# Patient Record
Sex: MALE | Race: OTHER | HISPANIC OR LATINO | URBAN - METROPOLITAN AREA
[De-identification: names, ages, dates, MRNs, and addresses within clinical notes are randomized per-mention and may not be internally consistent; named-entity substitution may affect disease eponyms.]

---

## 2023-07-03 ENCOUNTER — INPATIENT (INPATIENT)
Facility: HOSPITAL | Age: 58
LOS: 1 days | Discharge: HOME | DRG: 409 | End: 2023-07-05
Attending: SURGERY | Admitting: SURGERY
Payer: COMMERCIAL

## 2023-07-03 ENCOUNTER — TRANSCRIPTION ENCOUNTER (OUTPATIENT)
Age: 58
End: 2023-07-03

## 2023-07-03 VITALS
WEIGHT: 205.03 LBS | HEART RATE: 101 BPM | HEIGHT: 69 IN | RESPIRATION RATE: 20 BRPM | SYSTOLIC BLOOD PRESSURE: 139 MMHG | TEMPERATURE: 98 F | OXYGEN SATURATION: 95 % | DIASTOLIC BLOOD PRESSURE: 91 MMHG

## 2023-07-03 LAB
ALBUMIN SERPL ELPH-MCNC: 4.3 G/DL — SIGNIFICANT CHANGE UP (ref 3.3–5)
ALP SERPL-CCNC: 78 U/L — SIGNIFICANT CHANGE UP (ref 40–120)
ALT FLD-CCNC: 55 U/L — HIGH (ref 10–45)
ANION GAP SERPL CALC-SCNC: 14 MMOL/L — SIGNIFICANT CHANGE UP (ref 5–17)
AST SERPL-CCNC: 53 U/L — HIGH (ref 10–40)
BASOPHILS # BLD AUTO: 0.05 K/UL — SIGNIFICANT CHANGE UP (ref 0–0.2)
BASOPHILS NFR BLD AUTO: 0.4 % — SIGNIFICANT CHANGE UP (ref 0–2)
BILIRUB SERPL-MCNC: 1.1 MG/DL — SIGNIFICANT CHANGE UP (ref 0.2–1.2)
BUN SERPL-MCNC: 11 MG/DL — SIGNIFICANT CHANGE UP (ref 7–23)
CALCIUM SERPL-MCNC: 9.2 MG/DL — SIGNIFICANT CHANGE UP (ref 8.4–10.5)
CHLORIDE SERPL-SCNC: 102 MMOL/L — SIGNIFICANT CHANGE UP (ref 96–108)
CO2 SERPL-SCNC: 23 MMOL/L — SIGNIFICANT CHANGE UP (ref 22–31)
CREAT SERPL-MCNC: 0.85 MG/DL — SIGNIFICANT CHANGE UP (ref 0.5–1.3)
EGFR: 101 ML/MIN/1.73M2 — SIGNIFICANT CHANGE UP
EOSINOPHIL # BLD AUTO: 0.11 K/UL — SIGNIFICANT CHANGE UP (ref 0–0.5)
EOSINOPHIL NFR BLD AUTO: 0.9 % — SIGNIFICANT CHANGE UP (ref 0–6)
GAS PNL BLDV: SIGNIFICANT CHANGE UP
GLUCOSE SERPL-MCNC: 105 MG/DL — HIGH (ref 70–99)
HCT VFR BLD CALC: 44.9 % — SIGNIFICANT CHANGE UP (ref 39–50)
HGB BLD-MCNC: 14.5 G/DL — SIGNIFICANT CHANGE UP (ref 13–17)
IMM GRANULOCYTES NFR BLD AUTO: 0.3 % — SIGNIFICANT CHANGE UP (ref 0–0.9)
LIDOCAIN IGE QN: 25 U/L — SIGNIFICANT CHANGE UP (ref 7–60)
LYMPHOCYTES # BLD AUTO: 25.2 % — SIGNIFICANT CHANGE UP (ref 13–44)
LYMPHOCYTES # BLD AUTO: 3.16 K/UL — SIGNIFICANT CHANGE UP (ref 1–3.3)
MCHC RBC-ENTMCNC: 27.3 PG — SIGNIFICANT CHANGE UP (ref 27–34)
MCHC RBC-ENTMCNC: 32.3 GM/DL — SIGNIFICANT CHANGE UP (ref 32–36)
MCV RBC AUTO: 84.6 FL — SIGNIFICANT CHANGE UP (ref 80–100)
MONOCYTES # BLD AUTO: 0.86 K/UL — SIGNIFICANT CHANGE UP (ref 0–0.9)
MONOCYTES NFR BLD AUTO: 6.9 % — SIGNIFICANT CHANGE UP (ref 2–14)
NEUTROPHILS # BLD AUTO: 8.31 K/UL — HIGH (ref 1.8–7.4)
NEUTROPHILS NFR BLD AUTO: 66.3 % — SIGNIFICANT CHANGE UP (ref 43–77)
NRBC # BLD: 0 /100 WBCS — SIGNIFICANT CHANGE UP (ref 0–0)
PLATELET # BLD AUTO: 239 K/UL — SIGNIFICANT CHANGE UP (ref 150–400)
POTASSIUM SERPL-MCNC: 4 MMOL/L — SIGNIFICANT CHANGE UP (ref 3.5–5.3)
POTASSIUM SERPL-SCNC: 4 MMOL/L — SIGNIFICANT CHANGE UP (ref 3.5–5.3)
PROT SERPL-MCNC: 7.5 G/DL — SIGNIFICANT CHANGE UP (ref 6–8.3)
RBC # BLD: 5.31 M/UL — SIGNIFICANT CHANGE UP (ref 4.2–5.8)
RBC # FLD: 13.4 % — SIGNIFICANT CHANGE UP (ref 10.3–14.5)
SODIUM SERPL-SCNC: 139 MMOL/L — SIGNIFICANT CHANGE UP (ref 135–145)
WBC # BLD: 12.53 K/UL — HIGH (ref 3.8–10.5)
WBC # FLD AUTO: 12.53 K/UL — HIGH (ref 3.8–10.5)

## 2023-07-03 PROCEDURE — 99285 EMERGENCY DEPT VISIT HI MDM: CPT

## 2023-07-03 PROCEDURE — 71045 X-RAY EXAM CHEST 1 VIEW: CPT | Mod: 26

## 2023-07-03 RX ORDER — FAMOTIDINE 10 MG/ML
20 INJECTION INTRAVENOUS ONCE
Refills: 0 | Status: COMPLETED | OUTPATIENT
Start: 2023-07-03 | End: 2023-07-03

## 2023-07-03 RX ORDER — ACETAMINOPHEN 500 MG
1000 TABLET ORAL ONCE
Refills: 0 | Status: COMPLETED | OUTPATIENT
Start: 2023-07-03 | End: 2023-07-03

## 2023-07-03 RX ORDER — SODIUM CHLORIDE 9 MG/ML
1000 INJECTION, SOLUTION INTRAVENOUS ONCE
Refills: 0 | Status: COMPLETED | OUTPATIENT
Start: 2023-07-03 | End: 2023-07-03

## 2023-07-03 RX ADMIN — SODIUM CHLORIDE 1000 MILLILITER(S): 9 INJECTION, SOLUTION INTRAVENOUS at 21:29

## 2023-07-03 RX ADMIN — FAMOTIDINE 20 MILLIGRAM(S): 10 INJECTION INTRAVENOUS at 21:30

## 2023-07-03 RX ADMIN — Medication 400 MILLIGRAM(S): at 22:02

## 2023-07-03 NOTE — ED PROVIDER NOTE - ATTENDING CONTRIBUTION TO CARE
Attending MD Martini:  I personally have seen and examined this patient. I have performed a substantive portion of the visit including all aspects of the medical decision making.  Resident note reviewed and agree on plan of care and except where noted.      57-year-old gentleman presenting for evaluation of chest and abdominal pain since 3 days prior.  Patient states that the pain is constant worse with twisting motions of the torso.  He denies any nausea or vomiting.  He states he is also having subjective fevers and chills.  No bowel or bladder changes.  No blood in the stool.  He took diclofenac yesterday for the pain and it seemed to help.    Vital signs nonactionable patient sitting in the stretcher in no apparent distress.  Abdomen is soft nondistended mild tenderness in the epigastric region and intermittently in the right upper quadrant, negative Peres sign.  Extremities warm and well-perfused.  No midline spinal tenderness.  Moves all extremities spontaneously.    Patient underwent lab work in triage area that was notable for white count of 12, mild transaminitis in the 50s otherwise nonactionable, specifically normal lipase.  Patient had right upper quadrant ultrasound performed that did show evidence of cholelithiasis but no direct evidence of cholecystitis.  Also question of "renal mass of the interpolar region.".  Given these findings and unclear etiology of pain we will obtain CT abdomen/pelvis.  EKG is without ischemic findings so doubt cardiac ischemia however will rule out ACS with cardiac biomarkers.  I think it is unlikely that the patient has symptomatic cholelithiasis at this juncture but we will readdress this if no other pathology is identified on the CT abdomen pelvis.        *The above represents an initial assessment/impression. Please refer to progress notes for potential changes in patient clinical course*

## 2023-07-03 NOTE — ED PROVIDER NOTE - CLINICAL SUMMARY MEDICAL DECISION MAKING FREE TEXT BOX
57 year old male with no pmh come sinto the Ed for eval of diffuse abd pain that is worse on the right upper quadrant since  6/30. It was a sudden onset and he has had constant pain since. He has had some associated fever, chills, and nausea but no vomiting. Pt was QDoc'd and had RUQ US which shows stones but no cholecystitis. Pain does not appear to be consistent with just billiary colic. Pt has significant abd tenderness so will order a CT abd pelvis to assess for renal pathology, appy, diverticulitis. If work up is negative will reassess, PO challenge to determine dispo.

## 2023-07-03 NOTE — ED PROVIDER NOTE - OBJECTIVE STATEMENT
57 year old male with no pmh come sinto the Ed for eval of diffuse abd pain that is worse on the right upper quadrant since  6/30. It was a sudden onset and he has had constant pain since. He has had some associated fever, chills, and nausea but no vomiting. He has not had anything to eat since the pain has started because he has not had an appetite. He went to [facility] and had blood work as well as a CT scan of his abd. He was not given the results fo the CT scan but on the discharged paperwork, they diagnosed him with gallstones. He has been taking Diclofenac for his pain which he states has provided some relief. He has not had any abd surgeries in the past.

## 2023-07-03 NOTE — ED PROVIDER NOTE - PHYSICAL EXAMINATION
GENERAL: Not in acute distress, non-toxic appearing  HEAD: normocephalic, atraumatic  HEENT: EOMI, normal conjunctiva, oral mucosa moist, neck supple  CARDIAC: regular rate and rhythm, normal S1 and S2,  no appreciable murmurs  PULM: clear to ascultation bilaterally, no crackles, rales, rhonchi, or wheezing  GI: abdomen nondistended, soft, + diffuse abd tenderness worse on the RUQ, no guarding or rebound tenderness  : + R CVA tenderness  NEURO: alert and oriented x 3, normal speech, no focal motor or sensory deficits, gait normal, no gross neurologic deficit  MSK: No visible deformities, no peripheral edema, calf tenderness/redness/swelling  SKIN: No visible rashes, dry, well-perfused  PSYCH: appropriate mood and affect

## 2023-07-03 NOTE — ED ADULT NURSE NOTE - OBJECTIVE STATEMENT
Pt is 57Y M, denies pmhx, c/o abdominal pain, diffuse for 1 week. Pt is primarily Bulgarian speaking, offered  svc, pt preferred RN assistance, pt went to [facility] 5 days ago for diffuse, sharp abdominal pain, d/c with dx of gall stones, pt states pain has not subsided, radiating to epigastric area, nauseated with no vomiting or diarrhea, pt denies all other symptoms, pt is A&Ox3, ambulatory, placed on cardiac monitor-NSR, updated on plan of care

## 2023-07-03 NOTE — ED PROVIDER NOTE - RAPID ASSESSMENT
57 M no PMH asthma ( was dx 2 months after having an endoscopy) /PSH "pain in the R side of the chest and the back and I have asthma" Chest pain started on friday at 7 PM, he then went to [facility] wasn't given anything and then he developed stomach pain/back pain and fever.   mild ruq tenderness. pt speaking in full sentances   bharat [id number]     PCP- Chandler Morales 57 M no PMH asthma ( was dx 2 months after having an endoscopy) /PSH "pain in the R side of the chest and the back and I have asthma" Chest pain started on friday at 7 PM, he then went to [facility] wasn't given anything and then he developed stomach pain/back pain and fever.   mild ruq tenderness. pt speaking in full sentences   bharat [phone]       Attending Note --   I saw the patient waiting area; a brief history was taken and a thorough physical exam was not performed as there is no physical exam room available.  The patient will be seen and further worked up in the main emergency department and their care will be completed by the main emergency department team.  I was not involved in this patient's care during the QDOC process, and unless otherwise noted in the ED provider note, was not involved in their care during their ED course.    PCP- Chandler Morales

## 2023-07-03 NOTE — ED PROVIDER NOTE - PROGRESS NOTE DETAILS
Attending note (Leobardo): CT showing signs concerning for acute cholecystitis.  Labs reviewed white count is 12.5, lactate 0.9, lipase normal AST ALT 53 and 55 respectively.  Bilirubin 1.1.  Patient  in right upper quadrant, so acute cholecystitis remains on differential we will start IV Zosyn and consult with surgery for recommendations.

## 2023-07-04 ENCOUNTER — TRANSCRIPTION ENCOUNTER (OUTPATIENT)
Age: 58
End: 2023-07-04

## 2023-07-04 ENCOUNTER — RESULT REVIEW (OUTPATIENT)
Age: 58
End: 2023-07-04

## 2023-07-04 DIAGNOSIS — R10.9 UNSPECIFIED ABDOMINAL PAIN: ICD-10-CM

## 2023-07-04 LAB
APPEARANCE UR: CLEAR — SIGNIFICANT CHANGE UP
APTT BLD: 32.5 SEC — SIGNIFICANT CHANGE UP (ref 27.5–35.5)
BILIRUB UR-MCNC: NEGATIVE — SIGNIFICANT CHANGE UP
COLOR SPEC: YELLOW — SIGNIFICANT CHANGE UP
DIFF PNL FLD: NEGATIVE — SIGNIFICANT CHANGE UP
GLUCOSE UR QL: NEGATIVE — SIGNIFICANT CHANGE UP
INR BLD: 1.2 RATIO — HIGH (ref 0.88–1.16)
KETONES UR-MCNC: ABNORMAL
LEUKOCYTE ESTERASE UR-ACNC: NEGATIVE — SIGNIFICANT CHANGE UP
NITRITE UR-MCNC: NEGATIVE — SIGNIFICANT CHANGE UP
PH UR: 6.5 — SIGNIFICANT CHANGE UP (ref 5–8)
PROT UR-MCNC: ABNORMAL
PROTHROM AB SERPL-ACNC: 13.8 SEC — HIGH (ref 10.5–13.4)
SP GR SPEC: >1.05 (ref 1.01–1.02)
UROBILINOGEN FLD QL: ABNORMAL

## 2023-07-04 PROCEDURE — 74177 CT ABD & PELVIS W/CONTRAST: CPT | Mod: 26,MA

## 2023-07-04 PROCEDURE — 88304 TISSUE EXAM BY PATHOLOGIST: CPT | Mod: 26

## 2023-07-04 DEVICE — SURGICEL 4 X 8": Type: IMPLANTABLE DEVICE | Status: FUNCTIONAL

## 2023-07-04 DEVICE — VISTASEAL FIBRIN HUMAN 4ML: Type: IMPLANTABLE DEVICE | Status: FUNCTIONAL

## 2023-07-04 DEVICE — SURGICEL POWDER 3 GRAMS: Type: IMPLANTABLE DEVICE | Status: FUNCTIONAL

## 2023-07-04 DEVICE — CLIP APPLIER COVIDIEN ENDOCLIP II 10MM MED/LG: Type: IMPLANTABLE DEVICE | Status: FUNCTIONAL

## 2023-07-04 RX ORDER — CEFOTETAN DISODIUM 1 G
2 VIAL (EA) INJECTION EVERY 12 HOURS
Refills: 0 | Status: DISCONTINUED | OUTPATIENT
Start: 2023-07-04 | End: 2023-07-05

## 2023-07-04 RX ORDER — SODIUM CHLORIDE 9 MG/ML
1000 INJECTION, SOLUTION INTRAVENOUS
Refills: 0 | Status: DISCONTINUED | OUTPATIENT
Start: 2023-07-04 | End: 2023-07-05

## 2023-07-04 RX ORDER — HYDROMORPHONE HYDROCHLORIDE 2 MG/ML
0.5 INJECTION INTRAMUSCULAR; INTRAVENOUS; SUBCUTANEOUS
Refills: 0 | Status: DISCONTINUED | OUTPATIENT
Start: 2023-07-04 | End: 2023-07-04

## 2023-07-04 RX ORDER — ACETAMINOPHEN 500 MG
975 TABLET ORAL EVERY 6 HOURS
Refills: 0 | Status: DISCONTINUED | OUTPATIENT
Start: 2023-07-04 | End: 2023-07-05

## 2023-07-04 RX ORDER — PIPERACILLIN AND TAZOBACTAM 4; .5 G/20ML; G/20ML
3.38 INJECTION, POWDER, LYOPHILIZED, FOR SOLUTION INTRAVENOUS ONCE
Refills: 0 | Status: COMPLETED | OUTPATIENT
Start: 2023-07-04 | End: 2023-07-04

## 2023-07-04 RX ORDER — ONDANSETRON 8 MG/1
4 TABLET, FILM COATED ORAL ONCE
Refills: 0 | Status: DISCONTINUED | OUTPATIENT
Start: 2023-07-04 | End: 2023-07-04

## 2023-07-04 RX ORDER — SODIUM CHLORIDE 9 MG/ML
1000 INJECTION, SOLUTION INTRAVENOUS
Refills: 0 | Status: DISCONTINUED | OUTPATIENT
Start: 2023-07-04 | End: 2023-07-04

## 2023-07-04 RX ORDER — HEPARIN SODIUM 5000 [USP'U]/ML
5000 INJECTION INTRAVENOUS; SUBCUTANEOUS EVERY 8 HOURS
Refills: 0 | Status: DISCONTINUED | OUTPATIENT
Start: 2023-07-04 | End: 2023-07-05

## 2023-07-04 RX ORDER — OXYCODONE HYDROCHLORIDE 5 MG/1
5 TABLET ORAL EVERY 4 HOURS
Refills: 0 | Status: DISCONTINUED | OUTPATIENT
Start: 2023-07-04 | End: 2023-07-05

## 2023-07-04 RX ADMIN — HYDROMORPHONE HYDROCHLORIDE 0.5 MILLIGRAM(S): 2 INJECTION INTRAMUSCULAR; INTRAVENOUS; SUBCUTANEOUS at 19:40

## 2023-07-04 RX ADMIN — PIPERACILLIN AND TAZOBACTAM 200 GRAM(S): 4; .5 INJECTION, POWDER, LYOPHILIZED, FOR SOLUTION INTRAVENOUS at 06:04

## 2023-07-04 RX ADMIN — HEPARIN SODIUM 5000 UNIT(S): 5000 INJECTION INTRAVENOUS; SUBCUTANEOUS at 22:15

## 2023-07-04 RX ADMIN — HYDROMORPHONE HYDROCHLORIDE 0.5 MILLIGRAM(S): 2 INJECTION INTRAMUSCULAR; INTRAVENOUS; SUBCUTANEOUS at 19:27

## 2023-07-04 RX ADMIN — Medication 100 GRAM(S): at 22:15

## 2023-07-04 RX ADMIN — SODIUM CHLORIDE 75 MILLILITER(S): 9 INJECTION, SOLUTION INTRAVENOUS at 20:16

## 2023-07-04 RX ADMIN — Medication 975 MILLIGRAM(S): at 23:43

## 2023-07-04 NOTE — BRIEF OPERATIVE NOTE - OPERATION/FINDINGS
4-port lap cholecystectomy with cystic duct triply clipped 4-port lap cholecystectomy; inflamed and edematous gallbladder

## 2023-07-04 NOTE — PATIENT PROFILE ADULT - FALL HARM RISK - HARM RISK INTERVENTIONS

## 2023-07-04 NOTE — H&P ADULT - NSHPPHYSICALEXAM_GEN_ALL_CORE
GENERAL: NAD, lying in bed   NEURO: AOx3, awake alert appropriate  HEENT: NCAT, trachea midline  PULM: Respirations non-labored  ABD: Soft, +RUQ TTP, +Peres's sign,  non-distended, no peritonitis/rebound tenderness,   EXT: Warm, well perfused

## 2023-07-04 NOTE — ED ADULT NURSE REASSESSMENT NOTE - NS ED NURSE REASSESS COMMENT FT1
Report received from RN Jose. Pt A&Ox4, breathing spontaneously and unlabored, speaking full sentences, moving all extremities easily. Appropriate safety measures in place, call bell within reach, family at bedside.

## 2023-07-04 NOTE — H&P ADULT - HISTORY OF PRESENT ILLNESS
GENERAL SURGERY NOTE     HPI: 57 year old male with no pmh come sinto the Ed for eval of diffuse abd pain that is worse on the right upper quadrant since  6/30. It was a sudden onset and he has had constant pain since. He has had some associated fever, chills, and nausea but no vomiting. He has not had anything to eat since the pain has started because he has not had an appetite. He went to [facility] and had blood work as well as a CT scan of his abd. He was not given the results fo the CT scan but on the discharged paperwork, they diagnosed him with gallstones. He has been taking Diclofenac for his pain which he states has provided some relief. He has not had any abd surgeries in the past.    In ED, afebrile, VSS, labs significant for WBC 12.5, tbili 1.1, LFTs in 50s, ALP 78. CT shows distended GB, gallstones in the neck with wall thickening, pericholecystic edema and fat stranding.     Surgery consulted for acute cholecystitis.

## 2023-07-04 NOTE — PATIENT PROFILE ADULT - TRANSPORTATION
Apixaban/Eliquis - Compliance/Apixaban/Eliquis - Dietary Advice/Apixaban/Eliquis - Follow up monitoring/Apixaban/Eliquis - Potential for adverse drug reactions and interactions
no

## 2023-07-04 NOTE — ED ADULT NURSE REASSESSMENT NOTE - NS ED NURSE REASSESS COMMENT FT1
Pt placed in gown, all belongings given to pt wife, pt updated on plan of care, comfort and safety secured

## 2023-07-04 NOTE — H&P ADULT - ASSESSMENT
57M no PMH/PSH p/f acute cholecystitis.     PLAN  - Admit to Dr Elijah Parham  - Consent for lap mahin, possible open  - NPO/IVF  - Pre-operative labs    Discussed with Dr Parham    [facility] surgery  [id number]

## 2023-07-04 NOTE — ASU DISCHARGE PLAN (ADULT/PEDIATRIC) - NS MD DC FALL RISK RISK
For information on Fall & Injury Prevention, visit: [email or web address] OR  [email or web address] OR  https://www.cdc.gov/steadi/patient.html

## 2023-07-04 NOTE — PRE-OP CHECKLIST - BP NONINVASIVE DIASTOLIC (MM HG)
Pelvic rest for 6 week. Gradually resume normal activity. No driving for 1 week  Shower only for 6 weeks.
62

## 2023-07-04 NOTE — ASU DISCHARGE PLAN (ADULT/PEDIATRIC) - CARE PROVIDER_API CALL
Elijah Parham J  Surgery  [address] [place] [address], [address]  [place] [address]  Phone: [phone]  Fax: [phone]  Follow Up Time:

## 2023-07-04 NOTE — ASU DISCHARGE PLAN (ADULT/PEDIATRIC) - ASU DC SPECIAL INSTRUCTIONSFT
BATHING: Please do not submerge wound underwater. You may shower and/or sponge bathe.     ACTIVITY: No heavy lifting or straining. Otherwise, you may return to your usual level of physical activity. If you are taking narcotic pain medication (such as Percocet)   DO NOT drive a car, operate machinery or make important decisions.    DIET: Return to your usual diet.    NOTIFY YOUR SURGEON IF: You have any bleeding that does not stop, any pus draining from your wound(s), any fever (over 100.4 F) or chills, persistent nausea/vomiting, persistent diarrhea, or if your pain is not controlled on your discharge pain medications.    PAIN: You may take tylenol for pain. For severe pain, oxycodone has been prescribed to your pharmacy on file.    FOLLOW-UP: Please follow up with Dr. Parham in 2 weeks after discharge. Please call the number provided in order to set up appointment.

## 2023-07-04 NOTE — H&P ADULT - NSHPLABSRESULTS_GEN_ALL_CORE
CBC (07-03 @ 19:26)                              14.5                           12.53<H>  )----------------(  239        66.3  % Neutrophils, 25.2  % Lymphocytes, ANC: 8.31<H>                              44.9      BMP (07-03 @ 19:26)             139     |  102     |  11    		Ca++ --      Ca 9.2                ---------------------------------( 105<H>		Mg --                 4.0     |  23      |  0.85  			Ph --        LFTs (07-03 @ 19:26)      TPro 7.5 / Alb 4.3 / TBili 1.1 / DBili -- / AST 53<H> / ALT 55<H> / AlkPhos 78          VBG (07-03 @ 19:23)     7.41 / 43 / 36 / 27 / 2.2 / 61.3<L>%     Lactate: 0.9      < from: CT Abdomen and Pelvis w/ IV Cont (07.04.23 @ 01:59) >      PROCEDURE:  CT of the Abdomen and Pelvis was performed.  Sagittal and coronal reformats were performed.    FINDINGS:  LOWER CHEST: Within normal limits.    LIVER: Within normal limits.  BILE DUCTS: Normal caliber.  GALLBLADDER: Gallbladder is distended with stones in the neck. The   gallbladder wall demonstrates irregular hyperenhancement, wall   thickening, pericholecystic edema, and surrounding fat stranding   suggesting acute cholecystitis.    SPLEEN: Within normal limits.  PANCREAS: Within normal limits.  ADRENALS: Within normal limits.  KIDNEYS/URETERS: Right renal cysts.    BLADDER: Incompletely distended.  REPRODUCTIVE ORGANS: The prostate is not enlarged.    BOWEL: No bowel obstruction. Appendix is unremarkable. The colon is   underdistended without significant fecal load. No convincing focal bowel   wall thickening or diverticulitis. Scattered diverticulosis.  PERITONEUM: No ascites.  VESSELS: Aorta is not dilated. Mild atherosclerotic vascular   calcification.  RETROPERITONEUM/LYMPH NODES: No lymphadenopathy.  ABDOMINAL WALL: Bilateral fat-containing inguinal hernias.  BONES: Chronic degenerative changes of the spine.    IMPRESSION:  Findings suggest acute cholecystitis. If clinically indicated, further   evaluation with nuclear HIDA scan may be obtained.    Normal appendix.  No convincing focal bowel wall thickening.    < end of copied text >

## 2023-07-05 ENCOUNTER — TRANSCRIPTION ENCOUNTER (OUTPATIENT)
Age: 58
End: 2023-07-05

## 2023-07-05 VITALS
RESPIRATION RATE: 18 BRPM | DIASTOLIC BLOOD PRESSURE: 82 MMHG | TEMPERATURE: 98 F | OXYGEN SATURATION: 96 % | SYSTOLIC BLOOD PRESSURE: 125 MMHG | HEART RATE: 89 BPM

## 2023-07-05 LAB
ALBUMIN SERPL ELPH-MCNC: 4 G/DL — SIGNIFICANT CHANGE UP (ref 3.3–5)
ALP SERPL-CCNC: 87 U/L — SIGNIFICANT CHANGE UP (ref 40–120)
ALT FLD-CCNC: 107 U/L — HIGH (ref 10–45)
ANION GAP SERPL CALC-SCNC: 13 MMOL/L — SIGNIFICANT CHANGE UP (ref 5–17)
AST SERPL-CCNC: 81 U/L — HIGH (ref 10–40)
BILIRUB SERPL-MCNC: 0.6 MG/DL — SIGNIFICANT CHANGE UP (ref 0.2–1.2)
BUN SERPL-MCNC: 7 MG/DL — SIGNIFICANT CHANGE UP (ref 7–23)
CALCIUM SERPL-MCNC: 9 MG/DL — SIGNIFICANT CHANGE UP (ref 8.4–10.5)
CHLORIDE SERPL-SCNC: 102 MMOL/L — SIGNIFICANT CHANGE UP (ref 96–108)
CO2 SERPL-SCNC: 25 MMOL/L — SIGNIFICANT CHANGE UP (ref 22–31)
CREAT SERPL-MCNC: 0.67 MG/DL — SIGNIFICANT CHANGE UP (ref 0.5–1.3)
EGFR: 108 ML/MIN/1.73M2 — SIGNIFICANT CHANGE UP
GLUCOSE SERPL-MCNC: 125 MG/DL — HIGH (ref 70–99)
HCT VFR BLD CALC: 40.3 % — SIGNIFICANT CHANGE UP (ref 39–50)
HCV AB S/CO SERPL IA: 0.06 S/CO — SIGNIFICANT CHANGE UP (ref 0–0.99)
HCV AB SERPL-IMP: SIGNIFICANT CHANGE UP
HGB BLD-MCNC: 13 G/DL — SIGNIFICANT CHANGE UP (ref 13–17)
MAGNESIUM SERPL-MCNC: 2.5 MG/DL — SIGNIFICANT CHANGE UP (ref 1.6–2.6)
MCHC RBC-ENTMCNC: 27.4 PG — SIGNIFICANT CHANGE UP (ref 27–34)
MCHC RBC-ENTMCNC: 32.3 GM/DL — SIGNIFICANT CHANGE UP (ref 32–36)
MCV RBC AUTO: 84.8 FL — SIGNIFICANT CHANGE UP (ref 80–100)
NRBC # BLD: 0 /100 WBCS — SIGNIFICANT CHANGE UP (ref 0–0)
PHOSPHATE SERPL-MCNC: 3.2 MG/DL — SIGNIFICANT CHANGE UP (ref 2.5–4.5)
PLATELET # BLD AUTO: 218 K/UL — SIGNIFICANT CHANGE UP (ref 150–400)
POTASSIUM SERPL-MCNC: 4.4 MMOL/L — SIGNIFICANT CHANGE UP (ref 3.5–5.3)
POTASSIUM SERPL-SCNC: 4.4 MMOL/L — SIGNIFICANT CHANGE UP (ref 3.5–5.3)
PROT SERPL-MCNC: 7.3 G/DL — SIGNIFICANT CHANGE UP (ref 6–8.3)
RBC # BLD: 4.75 M/UL — SIGNIFICANT CHANGE UP (ref 4.2–5.8)
RBC # FLD: 13.2 % — SIGNIFICANT CHANGE UP (ref 10.3–14.5)
SODIUM SERPL-SCNC: 140 MMOL/L — SIGNIFICANT CHANGE UP (ref 135–145)
WBC # BLD: 10.09 K/UL — SIGNIFICANT CHANGE UP (ref 3.8–10.5)
WBC # FLD AUTO: 10.09 K/UL — SIGNIFICANT CHANGE UP (ref 3.8–10.5)

## 2023-07-05 RX ORDER — POTASSIUM CHLORIDE 20 MEQ
40 PACKET (EA) ORAL ONCE
Refills: 0 | Status: COMPLETED | OUTPATIENT
Start: 2023-07-05 | End: 2023-07-05

## 2023-07-05 RX ORDER — ACETAMINOPHEN 500 MG
3 TABLET ORAL
Qty: 0 | Refills: 0 | DISCHARGE
Start: 2023-07-05

## 2023-07-05 RX ORDER — OXYCODONE HYDROCHLORIDE 5 MG/1
1 TABLET ORAL
Qty: 8 | Refills: 0
Start: 2023-07-05 | End: 2023-07-06

## 2023-07-05 RX ADMIN — Medication 975 MILLIGRAM(S): at 11:56

## 2023-07-05 RX ADMIN — OXYCODONE HYDROCHLORIDE 5 MILLIGRAM(S): 5 TABLET ORAL at 13:03

## 2023-07-05 RX ADMIN — Medication 100 GRAM(S): at 09:02

## 2023-07-05 RX ADMIN — Medication 975 MILLIGRAM(S): at 00:43

## 2023-07-05 RX ADMIN — OXYCODONE HYDROCHLORIDE 5 MILLIGRAM(S): 5 TABLET ORAL at 09:32

## 2023-07-05 RX ADMIN — HEPARIN SODIUM 5000 UNIT(S): 5000 INJECTION INTRAVENOUS; SUBCUTANEOUS at 05:20

## 2023-07-05 RX ADMIN — Medication 40 MILLIEQUIVALENT(S): at 13:03

## 2023-07-05 RX ADMIN — OXYCODONE HYDROCHLORIDE 5 MILLIGRAM(S): 5 TABLET ORAL at 13:33

## 2023-07-05 RX ADMIN — Medication 975 MILLIGRAM(S): at 05:20

## 2023-07-05 RX ADMIN — OXYCODONE HYDROCHLORIDE 5 MILLIGRAM(S): 5 TABLET ORAL at 09:02

## 2023-07-05 RX ADMIN — Medication 975 MILLIGRAM(S): at 06:20

## 2023-07-05 RX ADMIN — Medication 975 MILLIGRAM(S): at 11:26

## 2023-07-05 NOTE — CHART NOTE - NSCHARTNOTEFT_GEN_A_CORE
Postop Check 7/5/2023 00:00am    Visit Summary: Patient seen and evaluated at bedside.    Procedure: Lap Cholecystectomy    Overnight Events: Patient states some distention, abdominal pain when he moves, no tenderness in abdominal exam and mild nausea due to gas.    Exam:  T(C): 36.8 (07-04-23 @ 23:49), Max: 37.1 (07-04-23 @ 15:34)  HR: 70 (07-04-23 @ 23:49) (62 - 89)  BP: 116/62 (07-04-23 @ 23:49) (96/51 - 135/75)  RR: 18 (07-04-23 @ 23:49) (16 - 18)  SpO2: 95% (07-04-23 @ 23:49) (93% - 99%)  Wt(kg): --    Physical exam:  Neuro AOOx3  Cardiac: RRR, no murmurs  Lungs: Bilateral air entry, no wheezing, stridor.  Abdomen: Partially distended, soft, nontender, steri strip in incision ports, no leakage of fluid.  Extremities: 4+ pulses in all extremities, capillary refill 2s.  Genitourinary: No doll                          14.5   12.53 )-----------( 239      ( 03 Jul 2023 19:26 )             44.9     07-03    139  |  102  |  11  ----------------------------<  105<H>  4.0   |  23  |  0.85    Ca    9.2      03 Jul 2023 19:26    TPro  7.5  /  Alb  4.3  /  TBili  1.1  /  DBili  x   /  AST  53<H>  /  ALT  55<H>  /  AlkPhos  78  07-03  PT/INR - ( 04 Jul 2023 08:15 )   PT: 13.8 sec;   INR: 1.20 ratio         PTT - ( 04 Jul 2023 08:15 )  PTT:32.5 sec

## 2023-07-05 NOTE — DISCHARGE NOTE PROVIDER - NSDCCPCAREPLAN_GEN_ALL_CORE_FT
PRINCIPAL DISCHARGE DIAGNOSIS  Diagnosis: Abdominal pain  Assessment and Plan of Treatment: Keep incisions clean and dry. Allow steristrips to fall off on their own. If increase in bleeding of incisions, fevers >100F, or worsening of pain occurs, please return to the emergency room. Follow-up with Dr. Parham in 1-2 weeks in his office.     PRINCIPAL DISCHARGE DIAGNOSIS  Diagnosis: Abdominal pain  Assessment and Plan of Treatment: WOUND CARE: -Covering your incisions are white pieces of tape called steri-strips. You can shower with these and they will curl up and begin to fall off on their own in about 7 days.   BATHING: Please do not submerge wound underwater. You may shower and/or sponge bathe.  ACTIVITY: No heavy lifting or straining. Otherwise, you may return to your usual level of physical activity. If you are taking narcotic pain medication (such as Percocet), do NOT drive a car, operate machinery or make important decisions.  DIET: Return to your usual diet.  NOTIFY YOUR SURGEON IF: You have any bleeding that does not stop, any pus draining from your wound, any fever (over 100.4 F) or chills, persistent nausea/vomiting, persistent diarrhea, or if your pain is not controlled on your discharge pain medications.  FOLLOW-UP:  1. Follow-up with Dr. Parham within 1-2 weeks of discharge.  Please call office for appointment  2. Please follow up with your primary care physician in one week regarding your hospitalization.

## 2023-07-05 NOTE — DISCHARGE NOTE NURSING/CASE MANAGEMENT/SOCIAL WORK - NSDCPEFALRISK_GEN_ALL_CORE
For information on Fall & Injury Prevention, visit: [email or web address] OR  [email or web address] OR  https://www.cdc.gov/steadi/patient.html

## 2023-07-05 NOTE — DISCHARGE NOTE PROVIDER - CARE PROVIDER_API CALL
Elijah Parham J  Surgery  [address] [place] [address], [address]  [place] [address]  Phone: [phone]  Fax: [phone]  Follow Up Time:

## 2023-07-05 NOTE — DISCHARGE NOTE PROVIDER - NSDCMRMEDTOKEN_GEN_ALL_CORE_FT
acetaminophen 325 mg oral tablet: 3 tab(s) orally every 6 hours   acetaminophen 325 mg oral tablet: 3 tab(s) orally every 6 hours  oxyCODONE 5 mg oral tablet: 1 tab(s) orally every 4 hours as needed for  severe pain MDD: 6

## 2023-07-05 NOTE — DISCHARGE NOTE PROVIDER - NSDCACTIVITY_GEN_ALL_CORE
No restrictions/Follow Instructions Provided by your Surgical Team Do not drive or operate machinery/No heavy lifting/straining/Follow Instructions Provided by your Surgical Team

## 2023-07-05 NOTE — PROGRESS NOTE ADULT - ASSESSMENT
Mr. Escobar is a 57y M POD 1 s/p lap mahin for acute cholecystitis. He tolerated the procedure well and is now seen recovering comfortably, denying nausea, vomiting, and abdominal pain. Vitals this morning reassuring for no acute post-op complications - HR WNL, normotensive, afebrile.     Plan:  - Pain: Tylenol ATC 975mg PO q6h. Oxycodone 5mg q4h PRN for severe pain.   - DVT PPx: SQH 5000u q8h.   - Diet: Clear liquids.   - Fluids: LR @ 75cc/hr.   - Goal today to advance to regular diet if continuing to tolerate clears and DC IVF if taking adequate PO.  Mr. Escobar is a 57y M POD 1 s/p lap mahin for acute cholecystitis. He tolerated the procedure well and is now seen recovering comfortably, denying nausea, vomiting, and abdominal pain. Vitals this morning reassuring for no acute post-op complications - HR WNL, normotensive, afebrile.     Plan:  - Pain: Tylenol ATC 975mg PO q6h. Oxycodone 5mg q4h PRN for severe pain.   - DVT PPx: SQH 5000u q8h.   - Diet: Clear liquids, advance diet  - Fluids: LR @ 75cc/hr.   - IVL  - AM labs  - possible d/c home later    [facility] surgery, pager #[phone]  Mr. Escobar is a 57y M POD 1 s/p lap mahin for acute cholecystitis. He tolerated the procedure well and is now seen recovering comfortably, denying nausea, vomiting, and abdominal pain. Vitals this morning reassuring for no acute post-op complications - HR WNL, normotensive, afebrile.     Plan:  - Pain: Tylenol ATC 975mg PO q6h. Oxycodone 5mg q4h PRN for severe pain.   - DVT PPx: SQH 5000u q8h.   - Diet: Clear liquids, advance diet to lowfat  - abx during admission, no abx on discharge  - IVL  - AM labs  - possible d/c home later    [facility] surgery, pager #[phone]

## 2023-07-05 NOTE — DISCHARGE NOTE NURSING/CASE MANAGEMENT/SOCIAL WORK - PATIENT PORTAL LINK FT
You can access the FollowMyHealth Patient Portal offered by [facility] by registering at the following website: [email or web address]. By joining [employer]’s FollowMyHealth portal, you will also be able to view your health information using other applications (apps) compatible with our system.

## 2023-07-05 NOTE — DISCHARGE NOTE PROVIDER - HOSPITAL COURSE
57 year old male with no pmh comes into the Ed for eval of diffuse abd pain that is worse on the right upper quadrant since  6/30. It was a sudden onset and he has had constant pain since. He has had some associated fever, chills, and nausea but no vomiting. He has not had anything to eat since the pain has started because he has not had an appetite. He went to [facility] and had blood work as well as a CT scan of his abd. He was not given the results fo the CT scan but on the discharged paperwork, they diagnosed him with gallstones. He has been taking Diclofenac for his pain which he states has provided some relief. He has not had any abd surgeries in the past.    In ED, afebrile, VSS, labs significant for WBC 12.5, tbili 1.1, LFTs in 50s, ALP 78. CT shows distended GB, gallstones in the neck with wall thickening, pericholecystic edema and fat stranding.     On 7/4/2023, patient underwent lap mahin for acute cholecystitis. He tolerated the procedure well. HR WNL, normotensive, afebrile. WBC 10.09, tbili 0.6. At the time of discharge, the patient was hemodynamically stable, was tolerating PO diet, was voiding urine and passing stool, was ambulating, and was comfortable with adequate pain control. The patient was instructed to follow up with Dr. Richardson within 1-2 weeks after discharge from the hospital. The patient felt comfortable with discharge. The patient was discharged to home. The patient had no other issues.     57 year old male with no pmh comes into the Ed for eval of diffuse abd pain that is worse on the right upper quadrant since  6/30. It was a sudden onset and he has had constant pain since. He has had some associated fever, chills, and nausea but no vomiting. He has not had anything to eat since the pain has started because he has not had an appetite. He went to [facility] and had blood work as well as a CT scan of his abd. He was not given the results fo the CT scan but on the discharged paperwork, they diagnosed him with gallstones. He has been taking Diclofenac for his pain which he states has provided some relief. He has not had any abd surgeries in the past.    In ED, afebrile, VSS, labs significant for WBC 12.5, tbili 1.1, LFTs in 50s, ALP 78. CT shows distended GB, gallstones in the neck with wall thickening, pericholecystic edema and fat stranding.     On 7/4/2023, patient underwent lap mahin for acute cholecystitis. He tolerated the procedure well. HR WNL, normotensive, afebrile. WBC 10.09, tbili 0.6. At the time of discharge, the patient was hemodynamically stable, was tolerating PO diet, was voiding, was ambulating, and was comfortable with adequate pain control. The patient was instructed to follow up with Dr. Richardson within 1-2 weeks after discharge from the hospital. The patient felt comfortable with discharge. The patient was discharged to home. The patient had no other issues.

## 2023-07-05 NOTE — PROGRESS NOTE ADULT - SUBJECTIVE AND OBJECTIVE BOX
[facility] Daily Resident Progress Note    Reason for admission:  Laparoscopic cholecystectomy    SUBJECTIVE: Pt seen and examined at bedside. Denies abdominal pain, nausea, vomiting. Passing flatus.     OBJECTIVE:  Vital Signs Last 24 Hrs  T(C): 36.5 (05 Jul 2023 04:24), Max: 37.1 (04 Jul 2023 15:34)  T(F): 97.7 (05 Jul 2023 04:24), Max: 98.7 (04 Jul 2023 15:34)  HR: 85 (05 Jul 2023 04:24) (62 - 89)  BP: 115/77 (05 Jul 2023 04:24) (96/51 - 135/75)  BP(mean): 86 (04 Jul 2023 20:30) (64 - 86)  RR: 18 (05 Jul 2023 04:24) (16 - 18)  SpO2: 95% (05 Jul 2023 04:24) (93% - 99%)  Parameters below as of 05 Jul 2023 04:24  Patient On (Oxygen Delivery Method): room air    I&O's Summary    04 Jul 2023 07:01  -  05 Jul 2023 05:23  --------------------------------------------------------  IN: 410 mL / OUT: 1750 mL / NET: -1340 mL    Allergies:  No Known Allergies    Medications:  MEDICATIONS  (STANDING):  acetaminophen     Tablet .. 975 milliGRAM(s) Oral every 6 hours  cefoTEtan  IVPB 2 Gram(s) IV Intermittent every 12 hours  heparin   Injectable 5000 Unit(s) SubCutaneous every 8 hours  lactated ringers. 1000 milliLiter(s) (75 mL/Hr) IV Continuous <Continuous>    MEDICATIONS  (PRN):  oxyCODONE    IR 5 milliGRAM(s) Oral every 4 hours PRN Severe Pain (7 - 10)    Physical Exam:  General Appearance: Appears well, NAD, A&Ox3  Chest: Equal expansion bilaterally, no increased WOB.   CV: RRR, WWP.   Abdomen:   Extremities: No swelling, asymmetry, or gross deformity appreciated.     Labs:                        14.5   12.53 )-----------( 239      ( 03 Jul 2023 19:26 )             44.9     07-03    139  |  102  |  11  ----------------------------<  105<H>  4.0   |  23  |  0.85    Ca    9.2      03 Jul 2023 19:26    TPro  7.5  /  Alb  4.3  /  TBili  1.1  /  DBili  x   /  AST  53<H>  /  ALT  55<H>  /  AlkPhos  78  07-03    PT/INR - ( 04 Jul 2023 08:15 )   PT: 13.8 sec;   INR: 1.20 ratio       PTT - ( 04 Jul 2023 08:15 )  PTT:32.5 sec  Urinalysis Basic - ( 04 Jul 2023 02:41 )    Color: Yellow / Appearance: Clear / SG: >1.050 / pH: x  Gluc: x / Ketone: Moderate  / Bili: Negative / Urobili: 2 mg/dL   Blood: x / Protein: 30 mg/dL / Nitrite: Negative   Leuk Esterase: Negative / RBC: 2 /hpf / WBC 1 /HPF   Sq Epi: x / Non Sq Epi: x / Bacteria: Negative   [facility] Daily Resident Progress Note    Reason for admission:  Laparoscopic cholecystectomy    SUBJECTIVE: Pt seen and examined at bedside. Denies abdominal pain, nausea, vomiting. Passing flatus.     OBJECTIVE:  Vital Signs Last 24 Hrs  T(C): 36.5 (05 Jul 2023 04:24), Max: 37.1 (04 Jul 2023 15:34)  T(F): 97.7 (05 Jul 2023 04:24), Max: 98.7 (04 Jul 2023 15:34)  HR: 85 (05 Jul 2023 04:24) (62 - 89)  BP: 115/77 (05 Jul 2023 04:24) (96/51 - 135/75)  BP(mean): 86 (04 Jul 2023 20:30) (64 - 86)  RR: 18 (05 Jul 2023 04:24) (16 - 18)  SpO2: 95% (05 Jul 2023 04:24) (93% - 99%)  Parameters below as of 05 Jul 2023 04:24  Patient On (Oxygen Delivery Method): room air    I&O's Summary    04 Jul 2023 07:01  -  05 Jul 2023 05:23  --------------------------------------------------------  IN: 410 mL / OUT: 1750 mL / NET: -1340 mL    Allergies:  No Known Allergies    Medications:  MEDICATIONS  (STANDING):  acetaminophen     Tablet .. 975 milliGRAM(s) Oral every 6 hours  cefoTEtan  IVPB 2 Gram(s) IV Intermittent every 12 hours  heparin   Injectable 5000 Unit(s) SubCutaneous every 8 hours  lactated ringers. 1000 milliLiter(s) (75 mL/Hr) IV Continuous <Continuous>    MEDICATIONS  (PRN):  oxyCODONE    IR 5 milliGRAM(s) Oral every 4 hours PRN Severe Pain (7 - 10)    Physical Exam:  General Appearance: Appears well, NAD, A&Ox3  Chest: Equal expansion bilaterally, no increased WOB.   CV: RRR, WWP.   Abdomen: Soft NTND.   Extremities: No swelling, asymmetry, or gross deformity appreciated.     Labs:                        14.5   12.53 )-----------( 239      ( 03 Jul 2023 19:26 )             44.9     07-03    139  |  102  |  11  ----------------------------<  105<H>  4.0   |  23  |  0.85    Ca    9.2      03 Jul 2023 19:26    TPro  7.5  /  Alb  4.3  /  TBili  1.1  /  DBili  x   /  AST  53<H>  /  ALT  55<H>  /  AlkPhos  78  07-03    PT/INR - ( 04 Jul 2023 08:15 )   PT: 13.8 sec;   INR: 1.20 ratio       PTT - ( 04 Jul 2023 08:15 )  PTT:32.5 sec  Urinalysis Basic - ( 04 Jul 2023 02:41 )    Color: Yellow / Appearance: Clear / SG: >1.050 / pH: x  Gluc: x / Ketone: Moderate  / Bili: Negative / Urobili: 2 mg/dL   Blood: x / Protein: 30 mg/dL / Nitrite: Negative   Leuk Esterase: Negative / RBC: 2 /hpf / WBC 1 /HPF   Sq Epi: x / Non Sq Epi: x / Bacteria: Negative   [facility] Daily Resident Progress Note    Reason for admission:  Laparoscopic cholecystectomy    SUBJECTIVE: Tolerating clears, denies N/V.  c/o pain relieved with medications.    OBJECTIVE:  Vital Signs Last 24 Hrs  T(C): 36.5 (05 Jul 2023 04:24), Max: 37.1 (04 Jul 2023 15:34)  T(F): 97.7 (05 Jul 2023 04:24), Max: 98.7 (04 Jul 2023 15:34)  HR: 85 (05 Jul 2023 04:24) (62 - 89)  BP: 115/77 (05 Jul 2023 04:24) (96/51 - 135/75)  BP(mean): 86 (04 Jul 2023 20:30) (64 - 86)  RR: 18 (05 Jul 2023 04:24) (16 - 18)  SpO2: 95% (05 Jul 2023 04:24) (93% - 99%)  Parameters below as of 05 Jul 2023 04:24  Patient On (Oxygen Delivery Method): room air    I&O's Summary    04 Jul 2023 07:01  -  05 Jul 2023 05:23  --------------------------------------------------------  IN: 410 mL / OUT: 1750 mL / NET: -1340 mL    Allergies:  No Known Allergies    Medications:  MEDICATIONS  (STANDING):  acetaminophen     Tablet .. 975 milliGRAM(s) Oral every 6 hours  cefoTEtan  IVPB 2 Gram(s) IV Intermittent every 12 hours  heparin   Injectable 5000 Unit(s) SubCutaneous every 8 hours  lactated ringers. 1000 milliLiter(s) (75 mL/Hr) IV Continuous <Continuous>    MEDICATIONS  (PRN):  oxyCODONE    IR 5 milliGRAM(s) Oral every 4 hours PRN Severe Pain (7 - 10)    Physical Exam:  General Appearance: Appears well, NAD, A&Ox3  Chest: Equal expansion bilaterally, no increased WOB.   CV: RRR, WWP.   Abdomen: Soft NTND. port sites C/D/I  Extremities: No swelling, asymmetry, or gross deformity appreciated.     Labs:                        14.5   12.53 )-----------( 239      ( 03 Jul 2023 19:26 )             44.9     07-03    139  |  102  |  11  ----------------------------<  105<H>  4.0   |  23  |  0.85    Ca    9.2      03 Jul 2023 19:26    TPro  7.5  /  Alb  4.3  /  TBili  1.1  /  DBili  x   /  AST  53<H>  /  ALT  55<H>  /  AlkPhos  78  07-03    PT/INR - ( 04 Jul 2023 08:15 )   PT: 13.8 sec;   INR: 1.20 ratio       PTT - ( 04 Jul 2023 08:15 )  PTT:32.5 sec  Urinalysis Basic - ( 04 Jul 2023 02:41 )    Color: Yellow / Appearance: Clear / SG: >1.050 / pH: x  Gluc: x / Ketone: Moderate  / Bili: Negative / Urobili: 2 mg/dL   Blood: x / Protein: 30 mg/dL / Nitrite: Negative   Leuk Esterase: Negative / RBC: 2 /hpf / WBC 1 /HPF   Sq Epi: x / Non Sq Epi: x / Bacteria: Negative

## 2023-07-06 PROBLEM — Z00.00 ENCOUNTER FOR PREVENTIVE HEALTH EXAMINATION: Status: ACTIVE | Noted: 2023-07-06

## 2023-07-07 PROBLEM — Z78.9 OTHER SPECIFIED HEALTH STATUS: Chronic | Status: ACTIVE | Noted: 2023-07-04

## 2023-07-12 LAB — SURGICAL PATHOLOGY STUDY: SIGNIFICANT CHANGE UP

## 2023-07-18 ENCOUNTER — APPOINTMENT (OUTPATIENT)
Dept: SURGERY | Facility: CLINIC | Age: 58
End: 2023-07-18
Payer: COMMERCIAL

## 2023-07-18 VITALS
TEMPERATURE: 97.1 F | OXYGEN SATURATION: 98 % | HEART RATE: 65 BPM | SYSTOLIC BLOOD PRESSURE: 119 MMHG | DIASTOLIC BLOOD PRESSURE: 80 MMHG | RESPIRATION RATE: 17 BRPM

## 2023-07-18 DIAGNOSIS — Z09 ENCOUNTER FOR FOLLOW-UP EXAMINATION AFTER COMPLETED TREATMENT FOR CONDITIONS OTHER THAN MALIGNANT NEOPLASM: ICD-10-CM

## 2023-07-18 PROCEDURE — 99024 POSTOP FOLLOW-UP VISIT: CPT

## 2023-07-18 RX ORDER — ACETAMINOPHEN 500 MG/1
500 TABLET, COATED ORAL
Refills: 0 | Status: ACTIVE | COMMUNITY

## 2023-07-18 NOTE — HISTORY OF PRESENT ILLNESS
[de-identified] : Barrera is a 57year old male here for a post operative visit for Laparoscopic cholecystectomy 06/07/23. Gallbladder, cholecystectomy- Acute and chronic cholecystitis- Cholelithiasis- One reactive lymph node.  Today pt reports mild incision discomfort. Pain managed with Tylenol.  Denies drainage, bleeding, swelling, and redness of surgical incision. Denies nausea and vomiting. Denies fever and chills. Normal BM, denies constipation or diarrhea. Good appetite. Not taking anticoagulants. \par

## 2023-10-24 NOTE — ED PROVIDER NOTE - PRINCIPAL DIAGNOSIS
Abdominal pain Island Pedicle Flap With Canthal Suspension Text: The defect edges were debeveled with a #15 scalpel blade.  Given the location of the defect, shape of the defect and the proximity to free margins an island pedicle advancement flap was deemed most appropriate.  Using a sterile surgical marker, an appropriate advancement flap was drawn incorporating the defect, outlining the appropriate donor tissue and placing the expected incisions within the relaxed skin tension lines where possible. The area thus outlined was incised deep to adipose tissue with a #15 scalpel blade.  The skin margins were undermined to an appropriate distance in all directions around the primary defect and laterally outward around the island pedicle utilizing iris scissors.  There was minimal undermining beneath the pedicle flap. A suspension suture was placed in the canthal tendon to prevent tension and prevent ectropion.

## 2023-12-15 PROCEDURE — 99285 EMERGENCY DEPT VISIT HI MDM: CPT | Mod: 25

## 2023-12-15 PROCEDURE — 88304 TISSUE EXAM BY PATHOLOGIST: CPT

## 2023-12-15 PROCEDURE — 84132 ASSAY OF SERUM POTASSIUM: CPT

## 2023-12-15 PROCEDURE — 86803 HEPATITIS C AB TEST: CPT

## 2023-12-15 PROCEDURE — 83735 ASSAY OF MAGNESIUM: CPT

## 2023-12-15 PROCEDURE — 84295 ASSAY OF SERUM SODIUM: CPT

## 2023-12-15 PROCEDURE — 96374 THER/PROPH/DIAG INJ IV PUSH: CPT

## 2023-12-15 PROCEDURE — 85730 THROMBOPLASTIN TIME PARTIAL: CPT

## 2023-12-15 PROCEDURE — 80053 COMPREHEN METABOLIC PANEL: CPT

## 2023-12-15 PROCEDURE — 86900 BLOOD TYPING SEROLOGIC ABO: CPT

## 2023-12-15 PROCEDURE — 84100 ASSAY OF PHOSPHORUS: CPT

## 2023-12-15 PROCEDURE — 96375 TX/PRO/DX INJ NEW DRUG ADDON: CPT

## 2023-12-15 PROCEDURE — 84484 ASSAY OF TROPONIN QUANT: CPT

## 2023-12-15 PROCEDURE — 82803 BLOOD GASES ANY COMBINATION: CPT

## 2023-12-15 PROCEDURE — 82947 ASSAY GLUCOSE BLOOD QUANT: CPT

## 2023-12-15 PROCEDURE — 85610 PROTHROMBIN TIME: CPT

## 2023-12-15 PROCEDURE — 85018 HEMOGLOBIN: CPT

## 2023-12-15 PROCEDURE — 85025 COMPLETE CBC W/AUTO DIFF WBC: CPT

## 2023-12-15 PROCEDURE — 83605 ASSAY OF LACTIC ACID: CPT

## 2023-12-15 PROCEDURE — 82435 ASSAY OF BLOOD CHLORIDE: CPT

## 2023-12-15 PROCEDURE — C1889: CPT

## 2023-12-15 PROCEDURE — 86901 BLOOD TYPING SEROLOGIC RH(D): CPT

## 2023-12-15 PROCEDURE — 76705 ECHO EXAM OF ABDOMEN: CPT

## 2023-12-15 PROCEDURE — C9399: CPT

## 2023-12-15 PROCEDURE — 83690 ASSAY OF LIPASE: CPT

## 2023-12-15 PROCEDURE — 71045 X-RAY EXAM CHEST 1 VIEW: CPT

## 2023-12-15 PROCEDURE — 74177 CT ABD & PELVIS W/CONTRAST: CPT | Mod: MA

## 2023-12-15 PROCEDURE — 85027 COMPLETE CBC AUTOMATED: CPT

## 2023-12-15 PROCEDURE — 85014 HEMATOCRIT: CPT

## 2023-12-15 PROCEDURE — 82330 ASSAY OF CALCIUM: CPT

## 2023-12-15 PROCEDURE — 81001 URINALYSIS AUTO W/SCOPE: CPT

## 2023-12-15 PROCEDURE — 86850 RBC ANTIBODY SCREEN: CPT

## 2025-04-25 ENCOUNTER — NON-APPOINTMENT (OUTPATIENT)
Age: 60
End: 2025-04-25

## 2025-04-29 ENCOUNTER — NON-APPOINTMENT (OUTPATIENT)
Age: 60
End: 2025-04-29

## 2025-05-01 ENCOUNTER — APPOINTMENT (OUTPATIENT)
Dept: UROLOGY | Facility: CLINIC | Age: 60
End: 2025-05-01
Payer: COMMERCIAL

## 2025-05-01 VITALS — WEIGHT: 195 LBS | HEIGHT: 69 IN | BODY MASS INDEX: 28.88 KG/M2

## 2025-05-01 VITALS
HEART RATE: 73 BPM | SYSTOLIC BLOOD PRESSURE: 110 MMHG | OXYGEN SATURATION: 98 % | DIASTOLIC BLOOD PRESSURE: 76 MMHG | TEMPERATURE: 97.1 F

## 2025-05-01 DIAGNOSIS — Z86.79 PERSONAL HISTORY OF OTHER DISEASES OF THE CIRCULATORY SYSTEM: ICD-10-CM

## 2025-05-01 DIAGNOSIS — R31.29 OTHER MICROSCOPIC HEMATURIA: ICD-10-CM

## 2025-05-01 DIAGNOSIS — Z78.9 OTHER SPECIFIED HEALTH STATUS: ICD-10-CM

## 2025-05-01 DIAGNOSIS — R97.20 ELEVATED PROSTATE, SPECIFIC ANTIGEN [PSA]: ICD-10-CM

## 2025-05-01 PROCEDURE — 99204 OFFICE O/P NEW MOD 45 MIN: CPT

## 2025-05-01 PROCEDURE — G2211 COMPLEX E/M VISIT ADD ON: CPT | Mod: NC

## 2025-05-01 RX ORDER — TAMSULOSIN HYDROCHLORIDE 0.4 MG/1
0.4 CAPSULE ORAL
Refills: 0 | Status: ACTIVE | COMMUNITY

## 2025-05-02 LAB
APPEARANCE: ABNORMAL
BACTERIA: NEGATIVE /HPF
BILIRUBIN URINE: NEGATIVE
BLOOD URINE: NEGATIVE
CALCIUM OXALATE CRYSTALS: PRESENT
CAST: 3 /LPF
COLOR: YELLOW
EPITHELIAL CELLS: 0 /HPF
GLUCOSE QUALITATIVE U: NEGATIVE MG/DL
KETONES URINE: ABNORMAL MG/DL
LEUKOCYTE ESTERASE URINE: NEGATIVE
MICROSCOPIC-UA: NORMAL
NITRITE URINE: NEGATIVE
PH URINE: 5
PROTEIN URINE: NEGATIVE MG/DL
PSA FREE FLD-MCNC: 9 %
PSA FREE SERPL-MCNC: 0.32 NG/ML
PSA SERPL-MCNC: 3.51 NG/ML
RED BLOOD CELLS URINE: 3 /HPF
REVIEW: NORMAL
SPECIFIC GRAVITY URINE: 1.03
UROBILINOGEN URINE: 0.2 MG/DL
WHITE BLOOD CELLS URINE: 0 /HPF

## 2025-05-05 LAB — BACTERIA UR CULT: NORMAL

## 2025-05-14 ENCOUNTER — APPOINTMENT (OUTPATIENT)
Dept: CT IMAGING | Facility: CLINIC | Age: 60
End: 2025-05-14
Payer: COMMERCIAL

## 2025-05-14 PROCEDURE — 74178 CT ABD&PLV WO CNTR FLWD CNTR: CPT

## 2025-05-29 ENCOUNTER — APPOINTMENT (OUTPATIENT)
Dept: UROLOGY | Facility: CLINIC | Age: 60
End: 2025-05-29
Payer: COMMERCIAL

## 2025-05-29 VITALS
TEMPERATURE: 98 F | HEART RATE: 65 BPM | OXYGEN SATURATION: 98 % | DIASTOLIC BLOOD PRESSURE: 75 MMHG | SYSTOLIC BLOOD PRESSURE: 134 MMHG

## 2025-05-29 DIAGNOSIS — N40.0 BENIGN PROSTATIC HYPERPLASIA WITHOUT LOWER URINARY TRACT SYMPMS: ICD-10-CM

## 2025-05-29 DIAGNOSIS — N20.0 CALCULUS OF KIDNEY: ICD-10-CM

## 2025-05-29 DIAGNOSIS — R31.29 OTHER MICROSCOPIC HEMATURIA: ICD-10-CM

## 2025-05-29 DIAGNOSIS — R97.20 ELEVATED PROSTATE, SPECIFIC ANTIGEN [PSA]: ICD-10-CM

## 2025-05-29 PROCEDURE — 52000 CYSTOURETHROSCOPY: CPT

## 2025-05-29 PROCEDURE — 99214 OFFICE O/P EST MOD 30 MIN: CPT | Mod: 25

## (undated) DEVICE — SOL IRR POUR H2O 250ML

## (undated) DEVICE — VALVE YELLOW PORT SEAL PLUS 5MM

## (undated) DEVICE — TUBING IRRIGATION DAVOL SYSTEM X STREAM

## (undated) DEVICE — TROCAR COVIDIEN VERSAONE BLADELESS FIXATION 11MM STANDARD

## (undated) DEVICE — SUT PDS II 0 18" ENDOLOOP LIGATURE

## (undated) DEVICE — SYR LUER LOK 20CC

## (undated) DEVICE — WARMING BLANKET UPPER ADULT

## (undated) DEVICE — SPECIMEN CONTAINER 100ML

## (undated) DEVICE — GLV 7.5 PROTEXIS (WHITE)

## (undated) DEVICE — SUT BIOSYN 4-0 18" P-12

## (undated) DEVICE — VENODYNE/SCD SLEEVE CALF LARGE

## (undated) DEVICE — PACK ADVANCED LAPAROSCOPIC NS

## (undated) DEVICE — DRSG OPSITE 2.5 X 2"

## (undated) DEVICE — MEDICATION LABELS W MARKER

## (undated) DEVICE — SOL IRR POUR NS 0.9% 500ML

## (undated) DEVICE — SHEARS COVIDIEN ENDO SHEAR 5MM X 31CM W UNIPOLAR CAUTERY

## (undated) DEVICE — ENDOCATCH 10MM SPECIMEN POUCH

## (undated) DEVICE — DRSG STERISTRIPS 0.5 X 4"

## (undated) DEVICE — STOPCOCK 3 WAY W TUBE 35"

## (undated) DEVICE — TUBING TRUWAVE PRESSURE MALE/FEMALE 72"

## (undated) DEVICE — DRAPE TOWEL BLUE 17" X 24"

## (undated) DEVICE — GOWN TRIMAX LG

## (undated) DEVICE — SUT POLYSORB 0 30" GS-23

## (undated) DEVICE — MARKING PEN W RULER

## (undated) DEVICE — TUBING INSUFFLATION LAP FILTER 10FT

## (undated) DEVICE — POSITIONER FOAM EGG CRATE ULNAR 2PCS (PINK)

## (undated) DEVICE — DRAPE C ARM UNIVERSAL

## (undated) DEVICE — SYR LUER LOK 30CC

## (undated) DEVICE — INSUFFLATION NDL COVIDIEN STEP 14G FOR STEP/VERSASTEP

## (undated) DEVICE — PREP CHLORAPREP HI-LITE ORANGE 26ML

## (undated) DEVICE — D HELP - CLEARVIEW CLEARIFY SYSTEM

## (undated) DEVICE — TROCAR COVIDIEN VERSASTEP PLUS 11MM STANDARD

## (undated) DEVICE — CATH IV SAFE INSYTE 14G X 1.75" (ORANGE)